# Patient Record
Sex: MALE | Race: WHITE | NOT HISPANIC OR LATINO | URBAN - METROPOLITAN AREA
[De-identification: names, ages, dates, MRNs, and addresses within clinical notes are randomized per-mention and may not be internally consistent; named-entity substitution may affect disease eponyms.]

---

## 2020-08-03 ENCOUNTER — OFFICE VISIT (OUTPATIENT)
Dept: URBAN - METROPOLITAN AREA CLINIC 23 | Facility: CLINIC | Age: 45
End: 2020-08-03
Payer: COMMERCIAL

## 2020-08-03 DIAGNOSIS — K76.0 FATTY LIVER: ICD-10-CM

## 2020-08-03 DIAGNOSIS — R10.84 ABDOMINAL CRAMPING, GENERALIZED: ICD-10-CM

## 2020-08-03 DIAGNOSIS — R79.89 ELEVATED FERRITIN: ICD-10-CM

## 2020-08-03 DIAGNOSIS — R10.9 ABDOMINAL PAIN: ICD-10-CM

## 2020-08-03 PROCEDURE — 99213 OFFICE O/P EST LOW 20 MIN: CPT | Performed by: INTERNAL MEDICINE

## 2020-08-03 NOTE — HPI-TODAY'S VISIT:
presents now for second opinion.  the pain has been going on for about 18 months at this time. has seen his pcp and was treated for reflux/ulcer with ppi. then saw Dr. Weldon.   the pain is always located in the right side underneath the ribcage- states that it is a constant pain and varies in intensity.  sometimes it wakes him up at night.  feels like a heating pad can sometimes make it better. if he lays on his side this sometimes helps.  worse if he gets onto that side.  he feels that there is no relationship to eating.  has tried alleve as well.  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  he can't tell if alleve helps the symptoms.  feeels the pain only tays in the right upper quadrant.   no change in bm, or other associated issues.  no change in appetite.   he has quit etoh and trying to eat more healthy, has lost about 10 pounds in about 3 months.  has not had labs checked with pcp recently. annual physicial last year. had liver enzymes with Dr. Weldon back in february.never had this pain before 18 months ago.

## 2020-08-03 NOTE — PHYSICAL EXAM GASTROINTESTINAL
Abdomen , soft, nontender in the ruq, nondistended , no guarding or rigidity , no masses palpable , normal bowel sounds , Liver and Spleen , no hepatomegaly present , no hepatosplenomegaly , liver nontender , spleen not palpable

## 2020-08-03 NOTE — HPI-OTHER HISTORIES
The patient is a 45-year-old male who was last seen in February 2020 by Dr. Nassar for abdominal pain in the right upper quadrant. He underwent upper endoscopy at that time which showed gastritis and had a biopsy is taken which showed no H. pylori or celiac disease. He had reported intermittent right upper quadrant pain for several months with a burning nonradiating pain not associated with nausea vomiting took Aleve fairly frequently. Had previously taken omeprazole which helped. Denies any family history of colon cancer polyps or celiac disease he did undergo a right upper quadrant ultrasound which showed mild fatty liver but no gallstones

## 2023-01-10 ENCOUNTER — OFFICE VISIT (OUTPATIENT)
Dept: URBAN - METROPOLITAN AREA CLINIC 12 | Facility: CLINIC | Age: 48
End: 2023-01-10
Payer: COMMERCIAL

## 2023-01-10 ENCOUNTER — DASHBOARD ENCOUNTERS (OUTPATIENT)
Age: 48
End: 2023-01-10

## 2023-01-10 VITALS
HEART RATE: 84 BPM | DIASTOLIC BLOOD PRESSURE: 88 MMHG | HEIGHT: 72 IN | SYSTOLIC BLOOD PRESSURE: 145 MMHG | BODY MASS INDEX: 27.01 KG/M2 | WEIGHT: 199.4 LBS

## 2023-01-10 DIAGNOSIS — Z12.11 COLON CANCER SCREENING: ICD-10-CM

## 2023-01-10 DIAGNOSIS — R10.30 LOWER ABDOMINAL PAIN: ICD-10-CM

## 2023-01-10 DIAGNOSIS — R19.7 DIARRHEA, UNSPECIFIED TYPE: ICD-10-CM

## 2023-01-10 PROCEDURE — 99213 OFFICE O/P EST LOW 20 MIN: CPT | Performed by: INTERNAL MEDICINE

## 2023-01-10 NOTE — HPI-OTHER HISTORIES
47M. he has hseen dr byers and dr vargas for intermittent RUQ pain US abd--fatty liver. no gallstones EGD 2019--no significant abnormality. no H pylori or celiac CT abd was ordered by dr vargas in 8/2020 Ferritin was high but gene test neg for hemochromatosis . TODAY he says 4 weeks ago he had diarrhea for one day (11 loose BMs) next day he had lower abd pain. pain last for 10 days and then started going away diarrhea had resolved however diarrhea and pain came back for a day and then resolved normal BM and no pain for past 1 weeks no blood with diarrhea

## 2023-01-25 ENCOUNTER — WEB ENCOUNTER (OUTPATIENT)
Dept: URBAN - METROPOLITAN AREA CLINIC 23 | Facility: CLINIC | Age: 48
End: 2023-01-25

## 2023-02-27 ENCOUNTER — OFFICE VISIT (OUTPATIENT)
Dept: URBAN - METROPOLITAN AREA SURGERY CENTER 15 | Facility: SURGERY CENTER | Age: 48
End: 2023-02-27

## 2024-10-22 ENCOUNTER — OFFICE VISIT (OUTPATIENT)
Dept: URBAN - METROPOLITAN AREA CLINIC 12 | Facility: CLINIC | Age: 49
End: 2024-10-22

## 2024-10-22 VITALS
TEMPERATURE: 97.3 F | HEIGHT: 72 IN | SYSTOLIC BLOOD PRESSURE: 140 MMHG | BODY MASS INDEX: 26.74 KG/M2 | DIASTOLIC BLOOD PRESSURE: 83 MMHG | HEART RATE: 67 BPM | WEIGHT: 197.4 LBS

## 2024-10-22 NOTE — EXAM-PHYSICAL EXAM
Per rectum exam: MA present as chaperone. small non-thrombosed external hemorrhoids +. No mass/pain/blood on digital rectal exam (VANCE). yellowish green stool seen on VANCE

## 2024-10-22 NOTE — HPI-OTHER HISTORIES
49M here for screening/hemorrhoids . PMH  he has hseen dr byers and dr vargas for intermittent RUQ pain US abd--fatty liver. no gallstones EGD 2019--no significant abnormality. no H pylori or celiac CT abd was ordered by dr vargas in 8/2020 Ferritin was high but gene test neg for hemochromatosis . TODAY  he c/o anal pain from hemorrhoids it is much better now. he used hemorrhoid cream which helped him BM everyday. no blood i had ordered colonoscopy in the past but he did not schedule it

## 2024-10-31 ENCOUNTER — WEB ENCOUNTER (OUTPATIENT)
Dept: URBAN - METROPOLITAN AREA CLINIC 12 | Facility: CLINIC | Age: 49
End: 2024-10-31

## 2024-12-05 ENCOUNTER — WEB ENCOUNTER (OUTPATIENT)
Dept: URBAN - METROPOLITAN AREA CLINIC 12 | Facility: CLINIC | Age: 49
End: 2024-12-05

## 2024-12-09 ENCOUNTER — OFFICE VISIT (OUTPATIENT)
Dept: URBAN - METROPOLITAN AREA SURGERY CENTER 15 | Facility: SURGERY CENTER | Age: 49
End: 2024-12-09
Payer: COMMERCIAL

## 2024-12-09 ENCOUNTER — CLAIMS CREATED FROM THE CLAIM WINDOW (OUTPATIENT)
Dept: URBAN - METROPOLITAN AREA CLINIC 4 | Facility: CLINIC | Age: 49
End: 2024-12-09
Payer: COMMERCIAL

## 2024-12-09 DIAGNOSIS — K64.8 OTHER HEMORRHOIDS: ICD-10-CM

## 2024-12-09 DIAGNOSIS — K63.5 COLON POLYP: ICD-10-CM

## 2024-12-09 DIAGNOSIS — D12.3 ADENOMA OF TRANSVERSE COLON: ICD-10-CM

## 2024-12-09 DIAGNOSIS — Z12.11 ENCOUNTER FOR SCREENING FOR MALIGNANT NEOPLASM OF COLON: ICD-10-CM

## 2024-12-09 DIAGNOSIS — K57.30 DIVERTICULOSIS OF COLON: ICD-10-CM

## 2024-12-09 DIAGNOSIS — D12.3 BENIGN NEOPLASM OF TRANSVERSE COLON: ICD-10-CM

## 2024-12-09 DIAGNOSIS — Z12.11 COLON CANCER SCREENING: ICD-10-CM

## 2024-12-09 PROCEDURE — 00812 ANES LWR INTST SCR COLSC: CPT | Performed by: NURSE ANESTHETIST, CERTIFIED REGISTERED

## 2024-12-09 PROCEDURE — 0528F RCMND FLW-UP 10 YRS DOCD: CPT | Performed by: INTERNAL MEDICINE

## 2024-12-09 PROCEDURE — 45380 COLONOSCOPY AND BIOPSY: CPT | Performed by: INTERNAL MEDICINE

## 2024-12-09 PROCEDURE — 88305 TISSUE EXAM BY PATHOLOGIST: CPT | Performed by: PATHOLOGY

## 2024-12-09 RX ORDER — HYDROCORTISONE 25 MG/G
1 APPLICATION CREAM TOPICAL
Qty: 1 | Refills: 3 | Status: ACTIVE | COMMUNITY
Start: 2024-11-01 | End: 2025-02-28

## 2024-12-30 ENCOUNTER — OFFICE VISIT (OUTPATIENT)
Dept: URBAN - METROPOLITAN AREA CLINIC 12 | Facility: CLINIC | Age: 49
End: 2024-12-30
Payer: COMMERCIAL

## 2024-12-30 VITALS
HEIGHT: 72 IN | BODY MASS INDEX: 27.5 KG/M2 | TEMPERATURE: 97.5 F | WEIGHT: 203 LBS | SYSTOLIC BLOOD PRESSURE: 128 MMHG | DIASTOLIC BLOOD PRESSURE: 79 MMHG | HEART RATE: 78 BPM

## 2024-12-30 DIAGNOSIS — K64.4 EXTERNAL HEMORRHOIDS: ICD-10-CM

## 2024-12-30 DIAGNOSIS — Z86.0101 HISTORY OF ADENOMATOUS POLYP OF COLON: ICD-10-CM

## 2024-12-30 DIAGNOSIS — K64.8 INTERNAL HEMORRHOIDS: ICD-10-CM

## 2024-12-30 DIAGNOSIS — K62.89 ANAL PAIN: ICD-10-CM

## 2024-12-30 DIAGNOSIS — K57.90 DIVERTICULOSIS: ICD-10-CM

## 2024-12-30 PROBLEM — 90458007: Status: ACTIVE | Noted: 2024-12-30

## 2024-12-30 PROBLEM — 23913003: Status: ACTIVE | Noted: 2024-12-30

## 2024-12-30 PROBLEM — 429047008: Status: ACTIVE | Noted: 2024-12-30

## 2024-12-30 PROBLEM — 397881000: Status: ACTIVE | Noted: 2024-12-30

## 2024-12-30 PROCEDURE — 99214 OFFICE O/P EST MOD 30 MIN: CPT

## 2024-12-30 RX ORDER — HYDROCORTISONE 25 MG/G
1 APPLICATION CREAM TOPICAL
Qty: 1 | Refills: 3 | Status: ACTIVE | COMMUNITY
Start: 2024-11-01 | End: 2025-02-28

## 2024-12-30 RX ORDER — HYDROCORTISONE ACETATE 25 MG/1
1 SUPPOSITORY SUPPOSITORY RECTAL ONCE A DAY
Qty: 14 SUPPOSITORY | Refills: 2 | OUTPATIENT
Start: 2024-12-30 | End: 2025-02-10

## 2024-12-30 NOTE — HPI-TODAY'S VISIT:
Pt is a 50 yo male presents today for a f/u.  Seen by Dr. Trevino for colon screening and anal pain. He was prescirbe Procto-Med HC 2.5% cream for his hemorrhoids.    Colonoscpy on 12/9/24 revealed 1 TA, diverticulosis, external and internal hemorrhoids. Repeat in 5 years advised.   Continues to experience intermittent rectal pain.  He has been using the prescribed cream, which has provided some symptom relief.  He believes the pain is caused by both internal and external hemorrhoids, with the internal hemorrhoids now being the primary source of discomfort.  It is aggravated by activity or prolonged sitting. Denies rectal bleeding.

## 2024-12-30 NOTE — PHYSICAL EXAM GASTROINTESTINAL
Abdomen , soft, nontender, nondistended , no guarding or rigidity , no masses palpable,  no hepatosplenomegaly , liver nontender Refer to clinical pharmacist

## 2025-03-05 ENCOUNTER — OFFICE VISIT (OUTPATIENT)
Dept: URBAN - METROPOLITAN AREA CLINIC 23 | Facility: CLINIC | Age: 50
End: 2025-03-05
Payer: COMMERCIAL

## 2025-03-05 VITALS
HEART RATE: 76 BPM | SYSTOLIC BLOOD PRESSURE: 133 MMHG | DIASTOLIC BLOOD PRESSURE: 87 MMHG | WEIGHT: 197 LBS | HEIGHT: 72 IN | BODY MASS INDEX: 26.68 KG/M2 | TEMPERATURE: 98.3 F

## 2025-03-05 DIAGNOSIS — K59.02 SPASTIC PELVIC FLOOR SYNDROME: ICD-10-CM

## 2025-03-05 DIAGNOSIS — R10.2 PERINEAL PAIN: ICD-10-CM

## 2025-03-05 DIAGNOSIS — K62.89 ANAL PAIN: ICD-10-CM

## 2025-03-05 DIAGNOSIS — R10.30 LOWER ABDOMINAL PAIN: ICD-10-CM

## 2025-03-05 PROBLEM — 83605009: Status: ACTIVE | Noted: 2025-03-05

## 2025-03-05 PROCEDURE — 99214 OFFICE O/P EST MOD 30 MIN: CPT | Performed by: INTERNAL MEDICINE

## 2025-03-05 RX ORDER — DICYCLOMINE HYDROCHLORIDE 20 MG/1
1 TABLET TABLET ORAL THREE TIMES A DAY
Qty: 90 TABLET | Refills: 5 | OUTPATIENT
Start: 2025-03-05 | End: 2025-09-01

## 2025-03-05 RX ORDER — HYDROCORTISONE 25 MG/G
1 APPLICATION CREAM TOPICAL
Qty: 1 | Refills: 3 | OUTPATIENT
Start: 2025-03-05 | End: 2025-07-03

## 2025-03-05 NOTE — HPI-OTHER HISTORIES
49M here for screening/hemorrhoids . PM US abd 2019--fatty liver. no gallstones EGD 2019--no significant abnormality. no H pylori or celiac [id number], tics, hemorrhoids. repeat in 2029 CT abd was ordered by dr vargas in 8/2020 Ferritin was high but gene test neg for hemochromatosis . Last visit he was given hydrocort supp for hemorrhoids . TODAY  he says he saw Dr Reich for hemorrhoids. Dr Stahl told him he doesnt need surgery for hemorrhoids He c/o lower abd pain. He also has anal pain. He says he feels 'clenched' in the lower abd as well as at the anus BM multiple times a day. Formed and loose. No blood C/o skin tag in the anal area which gets irritated